# Patient Record
Sex: MALE | Race: WHITE | NOT HISPANIC OR LATINO
[De-identification: names, ages, dates, MRNs, and addresses within clinical notes are randomized per-mention and may not be internally consistent; named-entity substitution may affect disease eponyms.]

---

## 2018-05-16 ENCOUNTER — APPOINTMENT (OUTPATIENT)
Dept: BARIATRICS | Facility: CLINIC | Age: 33
End: 2018-05-16
Payer: COMMERCIAL

## 2018-05-16 VITALS
HEIGHT: 65 IN | WEIGHT: 264.5 LBS | BODY MASS INDEX: 44.07 KG/M2 | SYSTOLIC BLOOD PRESSURE: 123 MMHG | OXYGEN SATURATION: 97 % | HEART RATE: 73 BPM | DIASTOLIC BLOOD PRESSURE: 65 MMHG | TEMPERATURE: 98.1 F

## 2018-05-16 PROCEDURE — 99202 OFFICE O/P NEW SF 15 MIN: CPT

## 2018-05-16 RX ORDER — LIRAGLUTIDE 6 MG/ML
18 INJECTION, SOLUTION SUBCUTANEOUS
Qty: 2 | Refills: 3 | Status: ACTIVE | COMMUNITY
Start: 2018-05-16 | End: 1900-01-01

## 2018-08-10 ENCOUNTER — APPOINTMENT (OUTPATIENT)
Dept: BARIATRICS | Facility: CLINIC | Age: 33
End: 2018-08-10
Payer: COMMERCIAL

## 2018-08-10 VITALS
HEIGHT: 65 IN | HEART RATE: 92 BPM | BODY MASS INDEX: 43.85 KG/M2 | SYSTOLIC BLOOD PRESSURE: 124 MMHG | DIASTOLIC BLOOD PRESSURE: 83 MMHG | WEIGHT: 263.2 LBS

## 2018-08-10 PROCEDURE — 99214 OFFICE O/P EST MOD 30 MIN: CPT

## 2018-12-21 ENCOUNTER — MOBILE ON CALL (OUTPATIENT)
Age: 33
End: 2018-12-21

## 2019-02-22 ENCOUNTER — APPOINTMENT (OUTPATIENT)
Dept: BARIATRICS | Facility: CLINIC | Age: 34
End: 2019-02-22
Payer: COMMERCIAL

## 2019-02-22 VITALS
HEART RATE: 86 BPM | BODY MASS INDEX: 45.62 KG/M2 | HEIGHT: 65 IN | DIASTOLIC BLOOD PRESSURE: 81 MMHG | SYSTOLIC BLOOD PRESSURE: 132 MMHG | WEIGHT: 273.8 LBS

## 2019-02-22 PROCEDURE — 99214 OFFICE O/P EST MOD 30 MIN: CPT

## 2019-02-22 NOTE — HISTORY OF PRESENT ILLNESS
[de-identified] : 32 year old male who is 18 years s/p open ds when weighed 340 then down to 240 and up to 290 and now 280\par eats over a pound of food and 18 bars of Atkins per day\par and moves bowels 6 x\par states very hungry and eats persistantlly\par seen with dietician over 30 minutes\par expect that sleeve portion has dilated out and intestine is keeping weight off\par consistent with this we started saxenda\par he said he eat 70 percent less\par but had no weight loss\par this means that the bypass limits what can be absorbed\par explained over 30 minutes that target is stomach but will restrict to under 1000 calories for several months but will gradually increase\par and that weight loss I would expect would 30 to 40 pounds and then gradual regain \par overall believe operation is highly effective\par returns after above discussion and wants to go forward with revision\par feel only target is to resleeve\par have looked through all the ugi and there is some dilation but this is difficult to assess and do not pressurive\par will also get egd\par refer to dr sykes\par

## 2019-02-22 NOTE — ASSESSMENT
[FreeTextEntry1] : have followed for years s/p open ds\par with recidivism\par will re sleeve the stomach \par the complexity and expectations discussed and risks of revisional surgery explained

## 2019-03-03 ENCOUNTER — TRANSCRIPTION ENCOUNTER (OUTPATIENT)
Age: 34
End: 2019-03-03

## 2019-03-22 ENCOUNTER — APPOINTMENT (OUTPATIENT)
Dept: BARIATRICS | Facility: CLINIC | Age: 34
End: 2019-03-22

## 2019-05-10 ENCOUNTER — APPOINTMENT (OUTPATIENT)
Dept: BARIATRICS | Facility: CLINIC | Age: 34
End: 2019-05-10
Payer: COMMERCIAL

## 2019-05-10 PROCEDURE — 97802 MEDICAL NUTRITION INDIV IN: CPT

## 2019-05-10 PROCEDURE — 90791 PSYCH DIAGNOSTIC EVALUATION: CPT

## 2019-07-25 ENCOUNTER — APPOINTMENT (OUTPATIENT)
Dept: BARIATRICS | Facility: CLINIC | Age: 34
End: 2019-07-25
Payer: COMMERCIAL

## 2019-07-25 VITALS
SYSTOLIC BLOOD PRESSURE: 128 MMHG | DIASTOLIC BLOOD PRESSURE: 82 MMHG | HEART RATE: 90 BPM | BODY MASS INDEX: 47.96 KG/M2 | WEIGHT: 288.2 LBS

## 2019-07-25 PROCEDURE — 99213 OFFICE O/P EST LOW 20 MIN: CPT

## 2019-07-26 ENCOUNTER — APPOINTMENT (OUTPATIENT)
Dept: BARIATRICS | Facility: CLINIC | Age: 34
End: 2019-07-26

## 2019-08-01 ENCOUNTER — APPOINTMENT (OUTPATIENT)
Dept: BARIATRICS | Facility: HOSPITAL | Age: 34
End: 2019-08-01

## 2019-08-05 ENCOUNTER — CLINICAL ADVICE (OUTPATIENT)
Age: 34
End: 2019-08-05

## 2019-08-23 ENCOUNTER — APPOINTMENT (OUTPATIENT)
Dept: BARIATRICS | Facility: CLINIC | Age: 34
End: 2019-08-23
Payer: COMMERCIAL

## 2019-08-23 VITALS
WEIGHT: 280.8 LBS | BODY MASS INDEX: 46.78 KG/M2 | SYSTOLIC BLOOD PRESSURE: 116 MMHG | HEART RATE: 79 BPM | DIASTOLIC BLOOD PRESSURE: 76 MMHG | HEIGHT: 65 IN

## 2019-08-23 DIAGNOSIS — E66.01 MORBID (SEVERE) OBESITY DUE TO EXCESS CALORIES: ICD-10-CM

## 2019-08-23 PROCEDURE — 99024 POSTOP FOLLOW-UP VISIT: CPT

## 2019-08-23 NOTE — HISTORY OF PRESENT ILLNESS
[de-identified] : 33 year old male s/p re-sleeve. Doing well medically, limited increased restriction but still on liquids. Will progress to solids next week. Incisions are fine, no fevers or chills.  Follow up in 6 weeks.

## 2019-10-11 ENCOUNTER — APPOINTMENT (OUTPATIENT)
Dept: BARIATRICS | Facility: CLINIC | Age: 34
End: 2019-10-11